# Patient Record
Sex: MALE | Race: WHITE | NOT HISPANIC OR LATINO | Employment: OTHER | URBAN - METROPOLITAN AREA
[De-identification: names, ages, dates, MRNs, and addresses within clinical notes are randomized per-mention and may not be internally consistent; named-entity substitution may affect disease eponyms.]

---

## 2022-02-09 PROCEDURE — 99283 EMERGENCY DEPT VISIT LOW MDM: CPT

## 2022-02-10 ENCOUNTER — HOSPITAL ENCOUNTER (EMERGENCY)
Facility: HOSPITAL | Age: 22
Discharge: HOME | End: 2022-02-10
Attending: EMERGENCY MEDICINE | Admitting: EMERGENCY MEDICINE

## 2022-02-10 ENCOUNTER — APPOINTMENT (OUTPATIENT)
Dept: GENERAL RADIOLOGY | Facility: HOSPITAL | Age: 22
End: 2022-02-10

## 2022-02-10 VITALS
TEMPERATURE: 98 F | WEIGHT: 98.3 LBS | HEIGHT: 76 IN | OXYGEN SATURATION: 94 % | BODY MASS INDEX: 11.97 KG/M2 | HEART RATE: 75 BPM | SYSTOLIC BLOOD PRESSURE: 126 MMHG | DIASTOLIC BLOOD PRESSURE: 61 MMHG | RESPIRATION RATE: 17 BRPM

## 2022-02-10 DIAGNOSIS — M79.671 RIGHT FOOT PAIN: Primary | ICD-10-CM

## 2022-02-10 PROCEDURE — 25010000002 TETANUS-DIPHTH-ACELL PERTUSSIS 5-2.5-18.5 LF-MCG/0.5 SUSPENSION PREFILLED SYRINGE: Performed by: PHYSICIAN ASSISTANT

## 2022-02-10 PROCEDURE — 90471 IMMUNIZATION ADMIN: CPT | Performed by: PHYSICIAN ASSISTANT

## 2022-02-10 PROCEDURE — 90715 TDAP VACCINE 7 YRS/> IM: CPT | Performed by: PHYSICIAN ASSISTANT

## 2022-02-10 PROCEDURE — 73630 X-RAY EXAM OF FOOT: CPT

## 2022-02-10 RX ADMIN — TETANUS TOXOID, REDUCED DIPHTHERIA TOXOID AND ACELLULAR PERTUSSIS VACCINE, ADSORBED 0.5 ML: 5; 2.5; 8; 8; 2.5 SUSPENSION INTRAMUSCULAR at 01:24

## 2022-02-10 RX ADMIN — IBUPROFEN 600 MG: 400 TABLET, FILM COATED ORAL at 01:46

## 2022-02-10 NOTE — ED PROVIDER NOTES
Subjective       Patient is a 21-year-old male comes in complaining of right foot pain.  Patient states that he stepped on the edge of a metal wire that was on the edge of the dog kennel around 6 AM this morning.  Patient states that he has had pain in his right foot where he had this puncture wound.  Patient states that the pain radiates across the pad of his foot and his midfoot.  Patient otherwise denies any rolling of the ankle or any subsequent injury.  Patient states the pain got worse throughout the day and is about a 6 out of 10 currently.  Patient took some ibuprofen this morning with little relief.  Patient does not believe his tetanus is up-to-date within the past 5 years.  Patient otherwise denies any head injury, loss consciousness, any other injury or vomiting.  Patient reports some tingling of the underside of his foot but otherwise denies any numbness or color change of the skin.      Review of Systems   Constitutional: Negative for chills, fatigue and fever.   HENT: Negative for congestion, sore throat, tinnitus and trouble swallowing.    Eyes: Negative for photophobia, discharge and visual disturbance.   Respiratory: Negative for cough, shortness of breath and wheezing.    Cardiovascular: Negative for chest pain, palpitations and leg swelling.   Gastrointestinal: Negative for abdominal pain, diarrhea, nausea and vomiting.   Genitourinary: Negative for dysuria, flank pain and urgency.   Musculoskeletal: Negative for arthralgias and myalgias.        Right foot pain.   Skin: Negative for rash.   Neurological: Negative for dizziness and headaches.   Psychiatric/Behavioral: Negative for confusion.       History reviewed. No pertinent past medical history.    No Known Allergies    History reviewed. No pertinent surgical history.    History reviewed. No pertinent family history.    Social History     Socioeconomic History   • Marital status: Single           Objective   Physical Exam  Vitals and nursing  note reviewed.   Constitutional:       General: He is not in acute distress.     Appearance: He is well-developed. He is not diaphoretic.   HENT:      Head: Normocephalic and atraumatic.      Right Ear: External ear normal.      Left Ear: External ear normal.      Nose: Nose normal.      Mouth/Throat:      Mouth: Mucous membranes are moist.      Pharynx: No oropharyngeal exudate or posterior oropharyngeal erythema.   Eyes:      Extraocular Movements: Extraocular movements intact.      Conjunctiva/sclera: Conjunctivae normal.      Pupils: Pupils are equal, round, and reactive to light.   Cardiovascular:      Rate and Rhythm: Normal rate and regular rhythm.      Pulses: Normal pulses.      Heart sounds: Normal heart sounds.      Comments: S1, S2 audible.  Pulmonary:      Effort: Pulmonary effort is normal. No respiratory distress.      Breath sounds: Normal breath sounds. No wheezing, rhonchi or rales.      Comments: On room air.  Abdominal:      General: Bowel sounds are normal. There is no distension.      Palpations: Abdomen is soft.      Tenderness: There is no abdominal tenderness. There is no right CVA tenderness, left CVA tenderness, guarding or rebound.   Musculoskeletal:         General: Tenderness and signs of injury present. No swelling or deformity. Normal range of motion.      Cervical back: Normal range of motion.      Right lower leg: No edema.      Left lower leg: No edema.      Comments: Patient had about a 2 mm pinpoint lesion about a centimeter proximal to the base of the fourth digit of the right foot on the plantar aspect.  No foreign bodies sticking out and no open wound present.  No bleeding present.  Patient has some tenderness to palpation in this area.  Patient has great range of motion ankle but some limited range of motion of right toe secondary to pain.  Patient is otherwise neurovascular intact distal to injury as well as good pedal pulse of the right foot.   Skin:     General: Skin is  "warm.      Capillary Refill: Capillary refill takes less than 2 seconds.      Findings: No erythema or rash.   Neurological:      General: No focal deficit present.      Mental Status: He is alert and oriented to person, place, and time.      Cranial Nerves: No cranial nerve deficit.      Sensory: No sensory deficit.      Motor: No weakness.   Psychiatric:         Mood and Affect: Mood normal.         Behavior: Behavior normal.         Procedures           ED Course      /61   Pulse 75   Temp 98 °F (36.7 °C)   Resp 17   Ht 193 cm (76\")   Wt 44.6 kg (98 lb 4.8 oz)   SpO2 94%   BMI 11.97 kg/m²   Labs Reviewed - No data to display  No radiology results for the last day                                             MDM  Number of Diagnoses or Management Options     Amount and/or Complexity of Data Reviewed  Tests in the radiology section of CPT®: reviewed  Independent visualization of images, tracings, or specimens: yes    Risk of Complications, Morbidity, and/or Mortality  Presenting problems: low  Diagnostic procedures: low  Management options: low    Patient Progress  Patient progress: stable       Chart review:    EKG: Not indicated  Imaging:    XR Foot 3+ View Right   ED Interpretation   X-ray reviewed by myself interpreted by , shows no acute abnormality or foreign body present.          Labs: Not indicated  Vitals:  /61   Pulse 75   Temp 98 °F (36.7 °C)   Resp 17   Ht 193 cm (76\")   Wt 44.6 kg (98 lb 4.8 oz)   SpO2 94%   BMI 11.97 kg/m²     Medications given:    Medications   Tetanus-Diphth-Acell Pertussis (BOOSTRIX) injection 0.5 mL (0.5 mL Intramuscular Given 2/10/22 0124)   ibuprofen (ADVIL,MOTRIN) tablet 600 mg (600 mg Oral Given 2/10/22 0146)       Procedures:    MDM: Patient is a 21-year-old male who suffered a puncture wound to the right foot.  Patient does not believe anything had broke off into his foot.  X-ray of right foot unremarkable for foreign body or acute osseous " abnormality.  Patient was given tetanus booster as well as ibuprofen.  Patient was fit with an Ace bandage of right foot.  Patient was given strict return precautions and voiced understanding. See full discharge instructions for further details.  Results and plan discussed with patient and is agreeable with plan.    Final diagnoses:   Right foot pain       ED Disposition  ED Disposition     ED Disposition Condition Comment    Discharge Stable           [facility] EMERGENCY DEPARTMENT  [address] [facility] [address] [phone]  Go in 1 day  As needed, If symptoms worsen    Keegan Zhang MD  [facility] [place] [address]  [phone]    In 1 week  As needed, If symptoms worsen for orthopedic follow up         Medication List      No changes were made to your prescriptions during this visit.          Kurt Gracia PA  02/10/22 0248

## 2022-02-10 NOTE — DISCHARGE INSTRUCTIONS
Take Tylenol and ibuprofen for pain control as needed as indicated on the bottle.  Rest, compress and elevate extremity as tolerated.  Recommend ice to injury 20 minutes on, 20 minutes off for a total of an hour at a time daily.  Can use Ace wrap or towel rather than placing ice directly on skin. Follow up with orthopedic surgery or primary care provider in 7-10 days if pain/swelling persists.